# Patient Record
(demographics unavailable — no encounter records)

---

## 2024-11-06 NOTE — HISTORY OF PRESENT ILLNESS
[FreeTextEntry1] : fu rash [de-identified] : Mr. jarod frey  is a 59 year old M here for evaluation of below F/U, LV MARCH 2023 for rash on back deemed to be dermatitis,  given TAC with improvement. here for refills    SH: previously undomiciled, now has apt

## 2024-11-06 NOTE — ASSESSMENT
[FreeTextEntry1] : #Dermatitis with #post-inflammatory hyperpigmentation, chronic flaring - Favored to be an eczematous dermatitis; however distribution was somewhat atypical; resolved with topical TAC use initially - refilled Triamcinolone for now to restart prn additional flares. SED  RTC PRN

## 2024-11-06 NOTE — PHYSICAL EXAM
[Alert] : alert [Oriented x 3] : ~L oriented x 3 [Well Nourished] : well nourished [Conjunctiva Non-injected] : conjunctiva non-injected [No Visual Lymphadenopathy] : no visual  lymphadenopathy [No Clubbing] : no clubbing [No Edema] : no edema [No Bromhidrosis] : no bromhidrosis [No Chromhidrosis] : no chromhidrosis [FreeTextEntry3] : eczematous plaques and excoriated papules on back

## 2024-11-12 NOTE — HISTORY OF PRESENT ILLNESS
[FreeTextEntry1] : 59 yo male with hypertension is a new patient to the Urology clinic referred by his PCP for elevated PSA.   Patient states he had a PSA done with his PCP.  The exact result of that lab is not documented in his chart. He was seen by Dr. Trejo at the [facility] office earlier this year.  He states he did not follow-up after the last time he was seen at the [facility] office because his PCP did not receive results of any tests that were done or progress notes regarding his visits to urology.  Patient had a PSA on 04/04/2024, which was 5.81. A renal bladder ultrasound was done 05/12/2024.  The images were reviewed and interpreted by me. -Right kidney: 11.5 cm. No hydronephrosis or calculi. -Left kidney: 11.3 cm. No hydronephrosis or calculi. Upper pole cyst measuring 6.8 x 6.7 x 6.5 cm. Lower pole cyst with thin incomplete septation measuring 3.5 x 2.5 x 2.4 cm. -Urinary bladder: Prevoid volume 165 mL. No intraluminal debris or stone seen. Bilateral ureteral jets visualized. Postvoid residual 9 mL. Bladder wall thickness upper limits of normal 0.5 cm. -Prostate gland is enlarged measuring 5 x 3.9 x 5.1 cm with a volume of 51 mL.  Patient denies dysuria, urinary incontinence, urinary frequency, and fever.

## 2024-11-12 NOTE — ASSESSMENT
[FreeTextEntry1] :  61 yo male with hypertension, BPH, and left renal cyst with incomplete septation evaluated for elevated PSA.  -Ordered repeat PSA.  -Ordered MRI prostate.  -Ordered CT w/ and w/o con to assess renal cyst.   Follow up in 1 month.

## 2024-11-12 NOTE — HISTORY OF PRESENT ILLNESS
[FreeTextEntry1] : 61 yo male with hypertension is a new patient to the Urology clinic referred by his PCP for elevated PSA.   Patient states he had a PSA done with his PCP.  The exact result of that lab is not documented in his chart. He was seen by Dr. rTejo at the [facility] office earlier this year.  He states he did not follow-up after the last time he was seen at the [facility] office because his PCP did not receive results of any tests that were done or progress notes regarding his visits to urology.  Patient had a PSA on 04/04/2024, which was 5.81. A renal bladder ultrasound was done 05/12/2024.  The images were reviewed and interpreted by me. -Right kidney: 11.5 cm. No hydronephrosis or calculi. -Left kidney: 11.3 cm. No hydronephrosis or calculi. Upper pole cyst measuring 6.8 x 6.7 x 6.5 cm. Lower pole cyst with thin incomplete septation measuring 3.5 x 2.5 x 2.4 cm. -Urinary bladder: Prevoid volume 165 mL. No intraluminal debris or stone seen. Bilateral ureteral jets visualized. Postvoid residual 9 mL. Bladder wall thickness upper limits of normal 0.5 cm. -Prostate gland is enlarged measuring 5 x 3.9 x 5.1 cm with a volume of 51 mL.  Patient denies dysuria, urinary incontinence, urinary frequency, and fever.

## 2025-05-08 NOTE — HISTORY OF PRESENT ILLNESS
[FreeTextEntry1] : 62 yo male with hyperlipidemia, asthma, COPD, degenerative disc disease was initially seen at the Urology clinic for elevated PSA and is now following up at the [facility] for elevated PSA and renal cyst.   Initial clinic visit 11/11/2024:  Patient states he had a PSA done with his PCP. The exact result of that lab is not documented in his chart. He was seen by Dr. Trejo at the [facility] earlier this year. He states he did not follow-up after the last time he was seen at the [facility] because his PCP did not receive results of any tests that were done or progress notes regarding his visits to urology.  Patient had a PSA on 04/04/2024, which was 5.81. A renal bladder ultrasound was done 05/12/2024. The images were reviewed and interpreted by me. -Right kidney: 11.5 cm. No hydronephrosis or calculi. -Left kidney: 11.3 cm. No hydronephrosis or calculi. Upper pole cyst measuring 6.8 x 6.7 x 6.5 cm. Lower pole cyst with thin incomplete septation measuring 3.5 x 2.5 x 2.4 cm. -Urinary bladder: Prevoid volume 165 mL. No intraluminal debris or stone seen. Bilateral ureteral jets visualized. Postvoid residual 9 mL. Bladder wall thickness upper limits of normal 0.5 cm. -Prostate gland is enlarged measuring 5 x 3.9 x 5.1 cm with a volume of 51 mL.  Patient denies dysuria, urinary incontinence, urinary frequency, and fever.   Office visit 05/07/2025:  At last visit a CT was ordered to assess a complex left renal cyst. Images reviewed -- 2 simple left renal cysts.  Official CT (12/28/2024) read:  - KIDNEYS: No hydronephrosis. Left renal 8 cm cyst; previously described incomplete septation not visualized, which may be hairline or artifactual on ultrasound (either Bosniak I or II). No suspicious renal mass. Additional 3.5 cm left lower renal pole simple cyst.  PSA profile:  - 11/11/2024: PSA 5.61; percent free PSA 14% - 04/04/2024: PSA 5.81; percent free PSA 10%  A prostate MRI was obtained for elevated PSA evaluation.  Prostate MRI 12/10/2024:  - 1 lesion on the left prostate - PI-RADS 5 - Prostate volume 45 cc

## 2025-05-08 NOTE — ASSESSMENT
[FreeTextEntry1] : 62 yo male with hyperlipidemia, asthma, COPD, degenerative disc disease was initially seen at the Urology clinic for elevated PSA and is now following up at the [facility] office for elevated PSA and renal cyst.   CT images reviewed. - 12/28/2024: 2 left simple renal cysts.  MRI report reviewed and discussed with patient. - 12/10/2024: 1 lesion on the left prostate; PI-RADS 5; prostate volume 45 cc  Recommended patient proceed with a transperineal prostate biopsy.  Procedure discussed in detail with patient.  He understands the risk of bleeding, infection, hematuria, hematospermia, urinary retention, which would require Bhatti catheter placement for urine drainage. Patient would like to proceed with biopsy.  OR booking placed for fusion guided transperineal prostate biopsy.  3D segmentation ordered.